# Patient Record
Sex: FEMALE | Race: WHITE | HISPANIC OR LATINO | URBAN - METROPOLITAN AREA
[De-identification: names, ages, dates, MRNs, and addresses within clinical notes are randomized per-mention and may not be internally consistent; named-entity substitution may affect disease eponyms.]

---
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
## 2024-12-26 ENCOUNTER — TELEPHONE (OUTPATIENT)
Dept: MEDICAL GROUP | Facility: CLINIC | Age: 24
End: 2024-12-26

## 2025-01-13 ENCOUNTER — DOCUMENTATION (OUTPATIENT)
Dept: MEDICAL GROUP | Facility: CLINIC | Age: 25
End: 2025-01-13

## 2025-01-13 ENCOUNTER — HOSPITAL ENCOUNTER (OUTPATIENT)
Facility: MEDICAL CENTER | Age: 25
End: 2025-01-13

## 2025-01-13 DIAGNOSIS — Z13.79 GENETIC SCREENING: ICD-10-CM

## 2025-01-13 DIAGNOSIS — Z12.4 SCREENING FOR CERVICAL CANCER: ICD-10-CM

## 2025-01-13 PROCEDURE — 88142 CYTOPATH C/V THIN LAYER: CPT

## 2025-01-13 PROCEDURE — 87591 N.GONORRHOEAE DNA AMP PROB: CPT

## 2025-01-13 PROCEDURE — 87491 CHLMYD TRACH DNA AMP PROBE: CPT

## 2025-01-13 NOTE — PROGRESS NOTES
"CC:No chief complaint on file.      HISTORY OF PRESENT ILLNESS: Patient is a 24 y.o. female established patient who presents today with:    Pt is requesting a pap smear, has never had one. No hx of STDs. Not currently sexually active. Pt is not taking birth control right now, has tried OCPs in the past to help regulate her periods as she has PCOS. States she has a \"double uterus.\"    Pt has a PCP Angel Bass who she sees every 3 months to manage her diabetes, hypothyroid and HLD.     Allergies:Patient has no known allergies.       Social History     Social History Narrative    Not on file     Fhx of breast cancer in grandma and a few aunts.     Exam:    Height: 5'6  Weight: 233  Temp: 99.0  BP: 134/98  Pulse: 89  Oxygen: 95%  RR: 16    Gen: Alert and oriented, No apparent distress. Well developed  HEENT: NCAT, MMM  Chest: No deformities, Equal chest expansion  Lungs: Normal effort, CTA bilaterally, no wheezes, rhonchi, or rales  CV: Regular rate and rhythm. No murmurs, rubs, or gallops. Pulse palpable  Abd: Soft, nontender, Non-distended  : Perineum and external genitalia normal without rash. Vagina with normal and physiologic discharge. Cervix without visible lesions or discharge. Bimanual exam without adnexal masses or cervical motion tenderness.  Neuro: A/O x 4, Motor/sensory grossly intact  Psych: Normal behavior, normal affect      Assessment/Plan:  Problem List Items Addressed This Visit    None     No orders of the defined types were placed in this encounter.    #Fhx breast cancer  - Pt referred for genetic screening research HNP. Orders placed along with CBC, CMP.    #Cervical cancer screening  Pap smear performed in office today. Will inform patient of results via [facility] or by phone.          Peter PEDRAZA Family Medicine Resident    "

## 2025-01-15 DIAGNOSIS — Z12.4 SCREENING FOR CERVICAL CANCER: ICD-10-CM

## 2025-01-16 LAB
C TRACH DNA GENITAL QL NAA+PROBE: NEGATIVE
N GONORRHOEA DNA GENITAL QL NAA+PROBE: NEGATIVE
SPECIMEN SOURCE: NORMAL

## 2025-01-22 LAB — THINPREP PAP, CYTOLOGY NL11781: NORMAL
